# Patient Record
Sex: MALE | NOT HISPANIC OR LATINO | URBAN - METROPOLITAN AREA
[De-identification: names, ages, dates, MRNs, and addresses within clinical notes are randomized per-mention and may not be internally consistent; named-entity substitution may affect disease eponyms.]

---

## 2021-10-26 ENCOUNTER — OFFICE VISIT (OUTPATIENT)
Dept: INTERNAL MEDICINE | Facility: CLINIC | Age: 26
End: 2021-10-26
Payer: COMMERCIAL

## 2021-10-26 VITALS
BODY MASS INDEX: 27.93 KG/M2 | HEART RATE: 73 BPM | WEIGHT: 217.6 LBS | SYSTOLIC BLOOD PRESSURE: 118 MMHG | OXYGEN SATURATION: 96 % | HEIGHT: 74 IN | RESPIRATION RATE: 16 BRPM | DIASTOLIC BLOOD PRESSURE: 79 MMHG | TEMPERATURE: 98 F

## 2021-10-26 DIAGNOSIS — B08.5 ACUTE PHARYNGITIS DUE TO COXSACKIE VIRUS: Primary | ICD-10-CM

## 2021-10-26 DIAGNOSIS — S89.90XS KNEE INJURY, UNSPECIFIED LATERALITY, SEQUELA: ICD-10-CM

## 2021-10-26 PROBLEM — R79.89 ELEVATED LFTS: Status: ACTIVE | Noted: 2021-10-08

## 2021-10-26 PROBLEM — Z83.3 FAMILY HISTORY OF DIABETES MELLITUS: Status: ACTIVE | Noted: 2021-10-08

## 2021-10-26 PROBLEM — M54.50 CHRONIC LEFT-SIDED LOW BACK PAIN WITHOUT SCIATICA: Status: ACTIVE | Noted: 2021-10-08

## 2021-10-26 PROBLEM — G89.29 CHRONIC LEFT-SIDED LOW BACK PAIN WITHOUT SCIATICA: Status: ACTIVE | Noted: 2021-10-08

## 2021-10-26 PROBLEM — M25.512 LEFT SHOULDER PAIN: Status: ACTIVE | Noted: 2021-10-08

## 2021-10-26 PROCEDURE — 99203 OFFICE O/P NEW LOW 30 MIN: CPT | Performed by: PEDIATRICS

## 2021-10-26 RX ORDER — COVID-19 ANTIGEN TEST
220 KIT MISCELLANEOUS DAILY PRN
COMMUNITY

## 2021-10-26 ASSESSMENT — PAIN SCALES - GENERAL: PAINLEVEL: NO PAIN (0)

## 2021-10-26 ASSESSMENT — MIFFLIN-ST. JEOR: SCORE: 2046.78

## 2021-10-26 NOTE — PROGRESS NOTES
Sore throat for a day. Work up with lots of pain, pain when swallowing.  He works as a  for the [employer], and is around a lot of people. They want to know if he is contagious. He was around high school football most recently last week.  He was just [place] the [employer] concert.          Wouldn't come in for the knee, but while he is here he wants me to look at it.  He plays soccer as keeper, and also plays flag football.  He felt sudden pressure right knee cap, epseically if squattnig or going up stairs.

## 2021-10-26 NOTE — NURSING NOTE
Chief Complaint   Patient presents with     Pharyngitis     Patient comes in to discuss a sore throat.     Knee Pain     Patient complains of right knee pain.         Juan Antonio Brady MA on 10/26/2021 at 5:30 PM

## 2021-10-26 NOTE — PROGRESS NOTES
"Dear patient. Thank you for visiting with me. I want you to feel respected, understood, and empowered. \"Respect\" is valuing you as much as I would a close family member. \"Empowerment\" happens when you are fully informed, and can make the best possible decision for you.  Please ask me questions!  Challenge anything that is not clear.    Medical records are primarily used as memory aids for me and my colleagues. Things to know about my documentation style:  - The 'problem list' includes current symptoms or diagnoses, and some problems that are resolved but may return. I use the past medical history for problems not expected to return.  - I use single quotation marks for things that you or I said, when I want to clarify who was speaking.  - I use double quotation marks when copying a term from elsewhere in your records. Italics (besides here) may also denote a quotation.  If you have questions or concerns, please contact me; I will reply as soon as time allows.      Armando Mariee is a 26 year old man, with concerns including:  Chief Complaint   Patient presents with     Pharyngitis     Patient comes in to discuss a sore throat.     Knee Pain     Patient complains of right knee pain.     PCP: Marc Barksdale   Visit type: problem-oriented    Assessment & Plan     Coxsackie-type enterovirus, possibly acquired at last week's high school football game, or possibly one of his other events.  Not likely to improve with antibiotics.  Recommended salt water gargles and salty broth, avoid cold/dry air.    He is contagious, but this virus is ubiquitous at this time of year, and the mask should help.  When in crowds he should wear a mask even if outside.    He may develop symptoms of headache, GI upset, or possibly mild joint aches in early November.  He can take acetaminophen or possibly ibuprofen, but should remain well-hydrated.    Knee injury seems benign. May have been partial tear of meniscus. Talked about " "exercise, caution with pivoting. If worsens or remains, should return for further assessment, consider PT      Subjective     Sore throat for a day. Work up with lots of pain, pain when swallowing.  He works as a  for the [employer], and is around a lot of people. They want to know if he is contagious. He was around high school football most recently last week.  He was just [place] the [employer] [place].         Wouldn't come in for the knee, but while he is here he wants me to look at it.  He plays soccer as keeper, and also plays flag football.  He felt sudden pressure right knee cap, epseically if squattnig or going up stairs.    He had Pfizer COVID immunization in March and April 2021.  He just had a normal/reassuring set of labs at his primary care provider's in the [employer].    Preventive visit in [facility] system 10/8/2021      Objective     /79 (BP Location: Right arm, Patient Position: Sitting, Cuff Size: Adult Regular)   Pulse 73   Temp 98  F (36.7  C) (Oral)   Resp 16   Ht 1.88 m (6' 2\")   Wt 98.7 kg (217 lb 9.6 oz)   SpO2 96%   BMI 27.94 kg/m      Physical Exam  Constitutional:       Appearance: Normal appearance.   HENT:      Head: Normocephalic.      Right Ear: Tympanic membrane and ear canal normal.      Left Ear: Tympanic membrane and ear canal normal.      Nose: Nose normal.      Mouth/Throat:      Mouth: Mucous membranes are moist.      Pharynx: Posterior oropharyngeal erythema (2 herpang ulcers visible - one on tonsil, another on phar arch.) present. No oropharyngeal exudate.   Eyes:      General: No scleral icterus.        Right eye: No discharge.         Left eye: No discharge.      Extraocular Movements: Extraocular movements intact.   Pulmonary:      Effort: Pulmonary effort is normal. No respiratory distress.      Breath sounds: No stridor. No wheezing.   Musculoskeletal:      Right knee: Normal. No swelling or deformity. No tenderness.      Left " knee: Normal. No swelling or deformity. No tenderness.   Skin:     Findings: No rash.   Neurological:      Mental Status: He is alert.   Psychiatric:         Mood and Affect: Mood normal.         Behavior: Behavior normal.         Thought Content: Thought content normal.         Judgment: Judgment normal.              About this visit:  Time note ((n3, 30'): The total time (on the date of service) for this service was 40 minutes, including discussion/face-to-face, chart review, interpretation not otherwise reported, documentation, and updating of the computerized record.

## 2022-02-06 ENCOUNTER — HEALTH MAINTENANCE LETTER (OUTPATIENT)
Age: 27
End: 2022-02-06

## 2022-10-03 ENCOUNTER — HEALTH MAINTENANCE LETTER (OUTPATIENT)
Age: 27
End: 2022-10-03

## 2023-02-11 ENCOUNTER — HEALTH MAINTENANCE LETTER (OUTPATIENT)
Age: 28
End: 2023-02-11

## 2024-10-05 ENCOUNTER — HEALTH MAINTENANCE LETTER (OUTPATIENT)
Age: 29
End: 2024-10-05